# Patient Record
Sex: FEMALE | Race: WHITE | NOT HISPANIC OR LATINO | URBAN - METROPOLITAN AREA
[De-identification: names, ages, dates, MRNs, and addresses within clinical notes are randomized per-mention and may not be internally consistent; named-entity substitution may affect disease eponyms.]

---
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
## 2025-01-29 NOTE — ED BEHAVIORAL HEALTH ASSESSMENT NOTE - KNOWN PSYCHIATRIC ADMISSION WITHIN THE PAST 30 DAYS
Pt call returned and  verified. Pt stated she doesn't have pain or drainage at incision site. Pt is reporting increased itching and persistent numbness. Pt looking for recommendation. Pt notified that these are not abnormal symptoms while the incision is still healing and with the winter weather. Pt advised to keep area moisturized with non scented product to avoid irritation. Pt informed that we will reach out to her if provider has further recommendations.    No

## 2025-07-16 ENCOUNTER — NON-APPOINTMENT (OUTPATIENT)
Age: 20
End: 2025-07-16

## 2025-08-01 ENCOUNTER — APPOINTMENT (OUTPATIENT)
Dept: SPEECH THERAPY | Facility: CLINIC | Age: 20
End: 2025-08-01